# Patient Record
Sex: FEMALE | Race: OTHER | Employment: STUDENT | URBAN - METROPOLITAN AREA
[De-identification: names, ages, dates, MRNs, and addresses within clinical notes are randomized per-mention and may not be internally consistent; named-entity substitution may affect disease eponyms.]

---

## 2020-10-12 ENCOUNTER — HOSPITAL ENCOUNTER (OUTPATIENT)
Age: 20
Discharge: HOME | End: 2020-10-12
Attending: EMERGENCY MEDICINE
Payer: COMMERCIAL

## 2020-10-12 VITALS
RESPIRATION RATE: 16 BRPM | WEIGHT: 130 LBS | BODY MASS INDEX: 23.92 KG/M2 | HEART RATE: 88 BPM | DIASTOLIC BLOOD PRESSURE: 86 MMHG | OXYGEN SATURATION: 99 % | SYSTOLIC BLOOD PRESSURE: 130 MMHG | HEIGHT: 62 IN | TEMPERATURE: 99 F

## 2020-10-12 DIAGNOSIS — T78.40XA ALLERGIC REACTION, INITIAL ENCOUNTER: Primary | ICD-10-CM

## 2020-10-12 PROCEDURE — 99203 OFFICE O/P NEW LOW 30 MIN: CPT

## 2020-10-12 PROCEDURE — 99204 OFFICE O/P NEW MOD 45 MIN: CPT

## 2020-10-12 RX ORDER — PREDNISONE 20 MG/1
60 TABLET ORAL ONCE
Status: COMPLETED | OUTPATIENT
Start: 2020-10-12 | End: 2020-10-12

## 2020-10-12 RX ORDER — METHYLPREDNISOLONE 4 MG/1
TABLET ORAL
Qty: 1 PACKAGE | Refills: 0 | Status: SHIPPED | OUTPATIENT
Start: 2020-10-12

## 2020-10-12 RX ORDER — DIPHENHYDRAMINE HCL 25 MG
50 CAPSULE ORAL ONCE
Status: COMPLETED | OUTPATIENT
Start: 2020-10-12 | End: 2020-10-12

## 2020-10-12 RX ORDER — IBUPROFEN 400 MG/1
400 TABLET ORAL EVERY 6 HOURS PRN
COMMUNITY

## 2020-10-12 NOTE — ED PROVIDER NOTES
Patient Seen in: [facility]      History   Patient presents with:   Eye Visual Problem  Allergic Rxn Allergies    Stated Complaint: Rig eye problem 5 pm    HPI    Patient is a 27-year-old female coming in for evaluation for right Constitutional:       General: She is not in acute distress. Appearance: She is well-developed. She is not toxic-appearing. HENT:      Head: Normocephalic and atraumatic. Eyes:      General: No scleral icterus.      Conjunctiva/sclera: Conjunctiva Medications Prescribed:  Current Discharge Medication List    START taking these medications    methylPREDNISolone (MEDROL) 4 MG Oral Tablet Therapy Pack  Dosepack: take as directed  Qty: 1 Package Refills: 0

## 2020-10-12 NOTE — ED INITIAL ASSESSMENT (HPI)
Pt noticed bump to R upper eyelid around 1700 - increased swelling since 1700; now with increased congestion to nose x 20 min;  Pt states the symptoms started after drinking 3 sips of a [employer] drink that she has often; denies allergies ; pt c/o facial sw

## (undated) NOTE — LETTER
Date & Time: 10/12/2020, 6:23 PM  Patient: Jimena Allen  Encounter Provider(s):    Issac Delaney MD       To Whom It May Concern:    Rene Brandyusha was seen and treated in our department on 10/12/2020.    Please excuse her from work until 10/1

## (undated) NOTE — LETTER
Date & Time: 10/12/2020, 6:23 PM  Patient: Yesi Second  Encounter Provider(s):    Zenaida Kim MD       To Whom It May Concern:    Eric Ornelas was seen and treated in our department on 10/12/2020.   Please excuse her from exams for 2 days